# Patient Record
Sex: MALE | Race: BLACK OR AFRICAN AMERICAN
[De-identification: names, ages, dates, MRNs, and addresses within clinical notes are randomized per-mention and may not be internally consistent; named-entity substitution may affect disease eponyms.]

---

## 2021-12-09 ENCOUNTER — HOSPITAL ENCOUNTER (OUTPATIENT)
Dept: HOSPITAL 96 - M.NUC | Age: 26
End: 2021-12-09
Attending: INTERNAL MEDICINE
Payer: COMMERCIAL

## 2021-12-09 DIAGNOSIS — R07.9: Primary | ICD-10-CM

## 2021-12-09 NOTE — CARDNUC
[place] [address]
Phone:  [phone]                     CARDIAC NUCLEAR IMAGING REPORT
_______________________________________________________________________________
 
Name:         ALFREDA PIPERMELODIE MADISON      Room:                     REG CLI
M.R.#:    [id number]     Account #:     [id number]  
Admission:    12/09/21    Attend Phys:   DANIEL Perez, 
Discharge:                Date of Birth: [birth year]  
Date of Service: 12/09/21 1311  Report #:      [id number]
        [id number] 
_______________________________________________________________________________
THIS REPORT FOR:
 
cc:  Physician not on staff        
     Physician not on staff        
     Hill Ye MD [facility]     
                                                                       ~
 
--------------- APPROVED REPORT --------------
 
 
Study performed:  12/09/2021 11:18:01
 
Exam: Nuclear Stress Test
Indication: Palpitations
Patient Location: Out-Patient
Stress Tech: CHRISTINE BUSTOS
Stress Nurse: Victorina Ibarra RN
NM Tech:CLARENCE Campbell
 
Ht: 5 ft 9 in  Wt: 170 lbs  BSA:  1.93 m2
    BMI:  25.10
 
Medical History
Medications: NONE
Allergies: No known drug allergies
Cardiac Risk Factors: FHX of CAD
Exercise History: Physically active
 
Stress Test Details
Stress Test:  Exercise stress testing was performed using a Marcelino 
protocol.      
HR           
Resting HR:            73 bpm   Max Heart Rate (APMHR): 195 bpm  
Max HR Achieved:  176 bpm   Target HR (85% APMHR): 165 bpm  
% of APMHR:         90         
Recovery HR:            101 bpm        
 
BP           
Resting BP:  154/78 mmHg        
Max BP:       215/77 mmHg        
 
ECG           
Resting ECG:  Sinus Rhythm        
Stress ECG:     Sinus Tachycardia       
ST Change: None          
Arrhythmia:    None         
 
 
[place] [address]
Phone:  [phone]                     CARDIAC NUCLEAR IMAGING REPORT
_______________________________________________________________________________
 
Name:         MARTIN PIPER      Room:                     REG CLI
M.R.#:    [id number]     Account #:     [id number]  
Admission:    12/09/21    Attend Phys:   DANIEL Perez, 
Discharge:                Date of Birth: [birth year]  
Date of Service: 12/09/21 1311  Report #:      [id number]
        [id number] 
_______________________________________________________________________________
Recovery ECG: Sinus Rhythm        
Recovery ST Change: None        
Recovery Arrhythmia: None        
 
Clinical
Reason for Termination: Fatigue
The patient tolerated standard Marcelino protocol exercise without 
significant cardiac complaint. The patient exhibited good exercise 
tolerance.
 
Stress ECG Conclusion
Baseline twelve-lead EKG shows sinus rhythm without significant ST 
segment or T wave abnormality.  EKGs obtained during and post 
exercise show sinus rhythm and sinus tachycardia with no significant 
ST segment or T wave changes when compared to baseline.  There were 
no stress-induced arrhythmias.
 
NM EXAM: Myocardial Perfusion REST/STRESS
Imaging Protocol: Rest Tc-99m/Stress Tc-99m 1 day
 
Resting Data
Rest SPECT myocardial perfusion imaging was performed in supine 
position 30 minutes following the intravenous injection of 9.5 mCi of 
Tc-99m Sestamibi.
Time of rest injection: 1010     Date: 12/09/2021
The images were gated to evaluate regional wall motion and calculate 
left ventricular ejection fraction. 
Administration Route: IV
Administration Site: Right Hand
 
Exercise Stress
At peak stress, the patient was injected intravenously with 34.9mCi 
of Tc-99m Sestamibi.
Time of stress injection: 1125     Date: 12/09/2021
Administration Route: IV
Administration Site: Right Hand
Gated Stress SPECT was performed 30 minutes after stress 
injection.
The images were gated to evaluate regional wall motion and calculate 
left ventricular ejection fraction. 
Prone imaging was performed.
 
Study Quality
Study: Good
Artifact: No artifact 
 
 
 
[place] [address]
Phone:  [phone]                     CARDIAC NUCLEAR IMAGING REPORT
_______________________________________________________________________________
 
Name:         MARTIN PIPER      Room:                     REG CLI
M.R.#:    [id number]     Account #:     [id number]  
Admission:    12/09/21    Attend Phys:   DANIEL Perez, 
Discharge:                Date of Birth: [birth year]  
Date of Service: 12/09/21 1311  Report #:      [id number]
        [id number] 
_______________________________________________________________________________
Study Data
At rest, the left ventricular ejection fraction was 60%..   
Post stress, the left ventricular ejection was 55%..   
TID = 0.91.       
 
Perfusion
Perfusion images obtained at rest and post exercise stress showed 
uniform uptake of the radioisotope throughout the myocardium.  There 
were no defects to suggest infarct or ischemia.
 
Wall Motion
Normal left ventricular wall motion.
 
Nuclear Conclusion
ECG Findings: negative for ischemia
Clinical Findings: negative for ischemia
Nuclear Findings: negative for ischemia
Exercise Capacity: normal
Left Ventricular Function: normal
Risk Study: low
Perfusion images show no defect to suggest infarct or ischemia.  Left 
ventricular systolic function appears normal on gated studies.  This 
is a low risk study. 
 
<Conclusion>
Baseline twelve-lead EKG shows sinus rhythm without significant ST 
segment or T wave abnormality.  EKGs obtained during and post 
exercise show sinus rhythm and sinus tachycardia with no significant 
ST segment or T wave changes when compared to baseline.  There were 
no stress-induced arrhythmias.
 
 
 
 
 
 
 
 
 
 
 
 
 
 
  <ELECTRONICALLY SIGNED>
                                           By: Hill Ye MD, FACC   
  12/09/21     1311
D: 12/09/21 1311   _____________________________________
T: 12/09/21 1311   Hill Ye MD, FACC     /INF